# Patient Record
Sex: MALE | Race: WHITE | HISPANIC OR LATINO | Employment: FULL TIME | URBAN - METROPOLITAN AREA
[De-identification: names, ages, dates, MRNs, and addresses within clinical notes are randomized per-mention and may not be internally consistent; named-entity substitution may affect disease eponyms.]

---

## 2017-06-23 ENCOUNTER — HOSPITAL ENCOUNTER (EMERGENCY)
Facility: MEDICAL CENTER | Age: 37
End: 2017-06-23
Attending: EMERGENCY MEDICINE
Payer: COMMERCIAL

## 2017-06-23 VITALS
HEART RATE: 77 BPM | SYSTOLIC BLOOD PRESSURE: 140 MMHG | OXYGEN SATURATION: 94 % | HEIGHT: 68 IN | RESPIRATION RATE: 18 BRPM | TEMPERATURE: 97.9 F | WEIGHT: 209.66 LBS | DIASTOLIC BLOOD PRESSURE: 94 MMHG | BODY MASS INDEX: 31.78 KG/M2

## 2017-06-23 DIAGNOSIS — S61.512A LACERATION OF LEFT WRIST, INITIAL ENCOUNTER: ICD-10-CM

## 2017-06-23 PROCEDURE — 303747 HCHG EXTRA SUTURE

## 2017-06-23 PROCEDURE — 90715 TDAP VACCINE 7 YRS/> IM: CPT | Performed by: EMERGENCY MEDICINE

## 2017-06-23 PROCEDURE — 304999 HCHG REPAIR-SIMPLE/INTERMED LEVEL 1

## 2017-06-23 PROCEDURE — 99283 EMERGENCY DEPT VISIT LOW MDM: CPT

## 2017-06-23 PROCEDURE — 90471 IMMUNIZATION ADMIN: CPT

## 2017-06-23 PROCEDURE — 700111 HCHG RX REV CODE 636 W/ 250 OVERRIDE (IP): Performed by: EMERGENCY MEDICINE

## 2017-06-23 PROCEDURE — 304217 HCHG IRRIGATION SYSTEM

## 2017-06-23 RX ADMIN — CLOSTRIDIUM TETANI TOXOID ANTIGEN (FORMALDEHYDE INACTIVATED), CORYNEBACTERIUM DIPHTHERIAE TOXOID ANTIGEN (FORMALDEHYDE INACTIVATED), BORDETELLA PERTUSSIS TOXOID ANTIGEN (GLUTARALDEHYDE INACTIVATED), BORDETELLA PERTUSSIS FILAMENTOUS HEMAGGLUTININ ANTIGEN (FORMALDEHYDE INACTIVATED), BORDETELLA PERTUSSIS PERTACTIN ANTIGEN, AND BORDETELLA PERTUSSIS FIMBRIAE 2/3 ANTIGEN 0.5 ML: 5; 2; 2.5; 5; 3; 5 INJECTION, SUSPENSION INTRAMUSCULAR at 14:32

## 2017-06-23 ASSESSMENT — PAIN SCALES - GENERAL
PAINLEVEL_OUTOF10: 0
PAINLEVEL_OUTOF10: 6

## 2017-06-23 NOTE — ED PROVIDER NOTES
"ED Provider Note    CHIEF COMPLAINT  Chief Complaint   Patient presents with   • Wrist Laceration       [facility]  Rayray Hensley is a 37 y.o. male who presents for evaluation of a laceration to his left wrist. 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 He developed bleeding and noted a laceration and is here for evaluation his last tetanus was 8 years ago. He denies any numbness or tingling to his hand he has full ability to move all his fingers and wrist. He is otherwise good health    REVIEW OF SYSTEMS  See HPI for further details. He denies any lightheadedness or dizziness    PAST MEDICAL HISTORY  History reviewed. No pertinent past medical history.    FAMILY HISTORY  History reviewed. No pertinent family history.    SOCIAL HISTORY  Social History     Social History   • Marital Status: N/A     Spouse Name: N/A   • Number of Children: N/A   • Years of Education: N/A     Social History Main Topics   • Smoking status: Never Smoker    • Smokeless tobacco: None   • Alcohol Use: Yes      Comment: Sometimes   • Drug Use: No   • Sexual Activity: Not Asked     Other Topics Concern   • None     Social History Narrative   • None       SURGICAL HISTORY  History reviewed. No pertinent past surgical history.    CURRENT MEDICATIONS  Home Medications     Reviewed by Madhavi Kaufman R.N. (Registered Nurse) on 06/23/17 at 1411  Med List Status: <None>    Medication Last Dose Status          Patient Boni Taking any Medications                         ALLERGIES  No Known Allergies    PHYSICAL EXAM  VITAL SIGNS: /96 mmHg  Pulse 81  Temp(Src) 36.6 °C (97.9 °F)  Resp 16  Ht 1.727 m (5' 8\")  Wt 95.1 kg (209 lb 10.5 oz)  BMI 31.89 kg/m2  SpO2 94%  Constitutional :  Well developed, Well nourished, No acute distress, Non-toxic appearance.   HENT: Head is atraumatic normocephalic  Eyes: Normal-appearing no evidence of infection  Neck: Normal range of motion, No tenderness, Supple, " No stridor.   Left wrist is notable for approximately 3 and half centimeter laceration to the flexor aspect of the left wrist, the patient has full range of motion of all digits of the left hand including thumb. He has no neurological deficit noted  Thorax & Lungs: No respiratory distress  Skin: Warm, Dry, No erythema, No rash.               COURSE & MEDICAL DECISION MAKING  Pertinent Labs & Imaging studies reviewed. (See chart for details)  The patient is presenting for evaluation of a laceration to his left wrist. The 3.5 cm laceration was anesthetized with 1% lidocaine and explored for any evidence of tendon injury none is noted no foreign bodies noted . The laceration is irrigated per protocol and closed with simple 4-0 nylon sutures. Wound care instructions are given tetanus immunization is given suture removal is recommended in 7 days is discharged in stable condition    FINAL IMPRESSION  1. Laceration of left wrist  2.   3.      Electronically signed by: Frederick Dickinson, 6/23/2017

## 2017-06-23 NOTE — DISCHARGE INSTRUCTIONS
Laceration Care, Adult  A laceration is a cut that goes through all layers of the skin. The cut also goes into the tissue that is right under the skin. Some cuts heal on their own. Others need to be closed with stitches (sutures), staples, skin adhesive strips, or wound glue. Taking care of your cut lowers your risk of infection and helps your cut to heal better.  HOW TO TAKE CARE OF YOUR CUT  For stitches or staples:  · Keep the wound clean and dry.  · If you were given a bandage (dressing), you should change it at least one time per day or as told by your doctor. You should also change it if it gets wet or dirty.  · Keep the wound completely dry for the first 24 hours or as told by your doctor. After that time, you may take a shower or a bath. However, make sure that the wound is not soaked in water until after the stitches or staples have been removed.  · Clean the wound one time each day or as told by your doctor:  ¨ Wash the wound with soap and water.  ¨ Rinse the wound with water until all of the soap comes off.  ¨ Pat the wound dry with a clean towel. Do not rub the wound.  · After you clean the wound, put a thin layer of antibiotic ointment on it as told by your doctor. This ointment:  ¨ Helps to prevent infection.  ¨ Keeps the bandage from sticking to the wound.  · Have your stitches or staples removed as told by your doctor.  If your doctor used skin adhesive strips:   · Keep the wound clean and dry.  · If you were given a bandage, you should change it at least one time per day or as told by your doctor. You should also change it if it gets dirty or wet.  · Do not get the skin adhesive strips wet. You can take a shower or a bath, but be careful to keep the wound dry.  · If the wound gets wet, pat it dry with a clean towel. Do not rub the wound.  · Skin adhesive strips fall off on their own. You can trim the strips as the wound heals. Do not remove any strips that are still stuck to the wound. They will  fall off after a while.  If your doctor used wound glue:  · Try to keep your wound dry, but you may briefly wet it in the shower or bath. Do not soak the wound in water, such as by swimming.  · After you take a shower or a bath, gently pat the wound dry with a clean towel. Do not rub the wound.  · Do not do any activities that will make you really sweaty until the skin glue has fallen off on its own.  · Do not apply liquid, cream, or ointment medicine to your wound while the skin glue is still on.  · If you were given a bandage, you should change it at least one time per day or as told by your doctor. You should also change it if it gets dirty or wet.  · If a bandage is placed over the wound, do not let the tape for the bandage touch the skin glue.  · Do not pick at the glue. The skin glue usually stays on for 5-10 days. Then, it falls off of the skin.  General Instructions   · To help prevent scarring, make sure to cover your wound with sunscreen whenever you are outside after stitches are removed, after adhesive strips are removed, or when wound glue stays in place and the wound is healed. Make sure to wear a sunscreen of at least 30 SPF.  · Take over-the-counter and prescription medicines only as told by your doctor.  · If you were given antibiotic medicine or ointment, take or apply it as told by your doctor. Do not stop using the antibiotic even if your wound is getting better.  · Do not scratch or pick at the wound.  · Keep all follow-up visits as told by your doctor. This is important.  · Check your wound every day for signs of infection. Watch for:  ¨ Redness, swelling, or pain.  ¨ Fluid, blood, or pus.  · Raise (elevate) the injured area above the level of your heart while you are sitting or lying down, if possible.  GET HELP IF:  · You got a tetanus shot and you have any of these problems at the injection site:  ¨ Swelling.  ¨ Very bad pain.  ¨ Redness.  ¨ Bleeding.  · You have a fever.  · A wound that was  closed breaks open.  · You notice a bad smell coming from your wound or your bandage.  · You notice something coming out of the wound, such as wood or glass.  · Medicine does not help your pain.  · You have more redness, swelling, or pain at the site of your wound.  · You have fluid, blood, or pus coming from your wound.  · You notice a change in the color of your skin near your wound.  · You need to change the bandage often because fluid, blood, or pus is coming from the wound.  · You start to have a new rash.  · You start to have numbness around the wound.  GET HELP RIGHT AWAY IF:  · You have very bad swelling around the wound.  · Your pain suddenly gets worse and is very bad.  · You notice painful lumps near the wound or on skin that is anywhere on your body.  · You have a red streak going away from your wound.  · The wound is on your hand or foot and you cannot move a finger or toe like you usually can.  · The wound is on your hand or foot and you notice that your fingers or toes look pale or bluish.     This information is not intended to replace advice given to you by your health care provider. Make sure you discuss any questions you have with your health care provider.     Document Released: 06/05/2009 Document Revised: 05/03/2016 Document Reviewed: 12/14/2015  [employer] Interactive Patient Education ©2016 [employer]

## 2017-06-23 NOTE — ED NOTES
ERP at bedside. Pt agrees with plan of care discussed by ERP. AIDET acknowledged with patient. Medicinal interventions carried out per ERP orders. Lac numbed. Irrigation in process. Gurney in low position, side rail up for pt safety. Call light within reach. Will continue to monitor.

## 2017-06-23 NOTE — LETTER
"  FORM C-4:  EMPLOYEE’S CLAIM FOR COMPENSATION/ REPORT OF INITIAL TREATMENT  EMPLOYEE’S CLAIM - PROVIDE ALL INFORMATION REQUESTED   First Name  Rayray Last Name  Shellie Birthdate             Age  1980 37 y.o. Sex  male Claim Number   Home Employee Address  [address]  [place]                                     [place]  [address] Height  1.727 m (5' 8\") Weight  95.1 kg (209 lb 10.5 oz) SSN  Unable to Obtain   Mailing Employee Address                           [address]  [place]               Zip  [address] Telephone  [phone] (home)  Primary Language Spoken  ENGLISH   Insurer  [employer] Third Party   [employer] Employee's Occupation (Job Title) When Injury or Occupational Disease Occurred  [employer]   Employer's Name  [employer] Telephone  [phone]    Employer Address  [address] [facility] [place]  [address]   Date of Injury  6/23/2017       Hour of Injury  2:30 PM Date Employer Notified  6/23/2017 Last Day of Work after Injury or Occupational Disease  6/23/2017 Supervisor to Whom Injury Reported  Max   Address or Location of Accident (if applicable)  [[facility]]   What were you doing at the time of accident? (if applicable)  Cortando Granito    How did this injury or occupational disease occur? Be specific and answer in detail. Use additional sheet if necessary)  Cortando se memovio la maquina por agarrar el pedaso de granito paro que no se callera   If you believe that you have an occupational disease, when did you first have knowledge of the disability and it relationship to your employment?  N/A Witnesses to the Accident  Eduardo     Nature of Injury or Occupational Disease  Laceration  Part(s) of Body Injured or Affected  Wrist (L) and Hand (L), N/A, N/A    I certify that the above is true and correct to the best of my knowledge and that I have provided this information in order to obtain the benefits of " [place]  I hereby authorize any physician, chiropractor, surgeon, practitioner, or other person, any hospital, including [facility] or [facility], any medical service organization, any insurance company, or other institution or organization to release to each other, any medical or other information, including benefits paid or payable, pertinent to this injury or disease, except information relative to diagnosis, treatment and/or counseling for AIDS, psychological conditions, alcohol or controlled substances, for which I must give specific authorization.  A Photostat of this authorization shall be as valid as the original.   Date  06/23/2017 Place  [facility] [facility] Employee’s Signature   THIS REPORT MUST BE COMPLETED AND MAILED WITHIN 3 WORKING DAYS OF TREATMENT   Place  [facility], EMERGENCY DEPT  Name of Facility   [facility]   Date  6/23/2017 Diagnosis  (S61.512A) Laceration of left wrist, initial encounter Is there evidence the injured employee was under the influence of alcohol and/or another controlled substance at the time of accident?   Hour  3:32 PM Description of Injury or Disease  Laceration of left wrist, initial encounter No   Treatment  Sutured laceration  Have you advised the patient to remain off work five days or more?         No   X-Ray Findings      If Yes   From Date    To Date      From information given by the employee, together with medical evidence, can you directly connect this injury or occupational disease as job incurred?  Yes If No, is the employee capable of: Full Duty  No Modified Duty  Yes   Is additional medical care by a physician indicated?    If Modified Duty, Specify any Limitations / Restrictions  Limited use of left hand     Do you know of any previous injury or disease contributing to this condition  "or occupational disease?  No   Date  6/23/2017 Print Doctor’s Name  Frederick Dickinson certify the employer’s copy of this form was mailed on:06/23/2017   Address  [address] [place] [address] [phone] Insurer’s Use Only   [facility]  [id number]    Provider’s Tax ID Number  [id number] Telephone  Dept: [phone]    Doctor’s Signature  e-FREDERICK Canela M.D. Degree   MD    Original - TREATING PHYSICIAN OR CHIROPRACTOR   Pg 2-Insurer/TPA   Pg 3-Employer   Pg 4-Employee                                                                                                  Form C-4 (rev01/03)     BRIEF DESCRIPTION OF RIGHTS AND BENEFITS  (Pursuant to [place] 616C.050)    Notice of Injury or Occupational Disease (Incident Report Form C-1): If an injury or occupational disease (OD) arises out of and in the course of employment, you must provide written notice to your employer as soon as practicable, but no later than 7 days after the accident or OD. Your employer shall maintain a sufficient supply of the required forms.    Claim for Compensation (Form C-4): If medical treatment is sought, the form C-4 is available at the place of initial treatment. A completed \"Claim for Compensation\" (Form C-4) must be filed within 90 days after an accident or OD. The treating physician or chiropractor must, within 3 working days after treatment, complete and mail to the employer, the employer's insurer and third-party , the Claim for Compensation.    Medical Treatment: If you require medical treatment for your on-the-job injury or OD, you may be required to select a physician or chiropractor from a list provided by your workers’ compensation insurer, if it has contracted with an Organization for Managed Care (MCO) or Preferred Provider Organization (PPO) or providers of health care. If your employer has not entered into a contract with an MCO or PPO, you may select a physician or chiropractor " from the Panel of Physicians and Chiropractors. Any medical costs related to your industrial injury or OD will be paid by your insurer.    Temporary Total Disability (TTD): If your doctor has certified that you are unable to work for a period of at least 5 consecutive days, or 5 cumulative days in a 20-day period, or places restrictions on you that your employer does not accommodate, you may be entitled to TTD compensation.    Temporary Partial Disability (TPD): If the wage you receive upon reemployment is less than the compensation for TTD to which you are entitled, the insurer may be required to pay you TPD compensation to make up the difference. TPD can only be paid for a maximum of 24 months.    Permanent Partial Disability (PPD): When your medical condition is stable and there is an indication of a PPD as a result of your injury or OD, within 30 days, your insurer must arrange for an evaluation by a rating physician or chiropractor to determine the degree of your PPD. The amount of your PPD award depends on the date of injury, the results of the PPD evaluation and your age and wage.    Permanent Total Disability (PTD): If you are medically certified by a treating physician or chiropractor as permanently and totally disabled and have been granted a PTD status by your insurer, you are entitled to receive monthly benefits not to exceed 66 2/3% of your average monthly wage. The amount of your PTD payments is subject to reduction if you previously received a PPD award.    Vocational Rehabilitation Services: You may be eligible for vocational rehabilitation services if you are unable to return to the job due to a permanent physical impairment or permanent restrictions as a result of your injury or occupational disease.    Transportation and Per Consuelo Reimbursement: You may be eligible for travel expenses and per consuelo associated with medical treatment.  Reopening: You may be able to reopen your claim if your condition  worsens after claim closure.    Appeal Process: If you disagree with a written determination issued by the insurer or the insurer does not respond to your request, you may appeal to the Department of Administration, , by following the instructions contained in your determination letter. You must appeal the determination within 70 days from the date of the determination letter at [address], [address], [place], [place] [address], or [address], [place], [place] [address]. If you disagree with the  decision, you may appeal to the Department of Administration, . You must file your appeal within 30 days from the date of the  decision letter at [address], [address], [place], [place] [address], or [address], [facility] [address], [place], [place] [address]. If you disagree with a decision of an , you may file a petition for judicial review with the District Court. You must do so within 30 days of the Appeal Officer’s decision. You may be represented by an  at your own expense or you may contact the [facility] for possible representation.    [place]  for Injured Workers (NAIW): If you disagree with a  decision, you may request that NAIW represent you without charge at an  Hearing. For information regarding denial of benefits, you may contact the [facility] at: [address], [address], [place] [address], [phone], or [address], [place] [address], [phone]    To File a Complaint with the Division: If you wish to file a complaint with the  of the Division of Industrial Relations (DIR), please contact the Workers’ Compensation Section, [address], [address], [place], [place] [address], telephone ([phone]) [phone], or [address], [place] [address], telephone [phone]  [phone].    For assistance with Workers’ Compensation Issues: you may contact the Office of the Governor Consumer Health Assistance, [address] [place] [address], [address], [place] [address], Toll Free 1-736.595.7404, Web site: [email or web address].[email or web address]., E-mail [email or web address].                                                                                                                                                                               __________________________________________________________________                                    06/23/2017            Employee Name / Signature                                                                                                                            Date                                       D-2 (rev. 10/07)

## 2017-06-23 NOTE — ED AVS SNAPSHOT
ANTERIOS Access Code: [email or web address]  Expires: 7/23/2017  3:42 PM    Your email address is not on file at [facility].  Email Addresses are required for you to sign up for ANTERIOS, please contact [phone] to verify your personal information and to provide your email address prior to attempting to register for ANTERIOS.    Rayray Treadwelloz  [address] [place] [address]    ANTERIOS  A secure, online tool to manage your health information     [facility]’s ANTERIOS® is a secure, online tool that connects you to your personalized health information from the privacy of your home -- day or night - making it very easy for you to manage your healthcare. Once the activation process is completed, you can even access your medical information using the ANTERIOS uche, which is available for free in the Apple Uche store or Google Play store.     To learn more about ANTERIOS, visit [email or web address]    There are two levels of access available (as shown below):   My Chart Features  [facility] Primary Care Doctor [facility]  Specialists [facility]  Urgent  Care Non-[facility] Primary Care Doctor   Email your healthcare team securely and privately 24/7 X X X    Manage appointments: schedule your next appointment; view details of past/upcoming appointments X      Request prescription refills. X      View recent personal medical records, including lab and immunizations X X X X   View health record, including health history, allergies, medications X X X X   Read reports about your outpatient visits, procedures, consult and ER notes X X X X   See your discharge summary, which is a recap of your hospital and/or ER visit that includes your diagnosis, lab results, and care plan X X  X     How to register for [employer]:  Once your e-mail address has been verified, follow the following steps to sign up for [employer].     1. Go to  [email or web address]  2. Click on the Sign Up Now box, which takes you to the New Member Sign Up page. You will need to  provide the following information:  a. Enter your Giphy Access Code exactly as it appears at the top of this page. (You will not need to use this code after you’ve completed the sign-up process. If you do not sign up before the expiration date, you must request a new code.)   b. Enter your date of birth.   c. Enter your home email address.   d. Click Submit, and follow the next screen’s instructions.  3. Create a Giphy ID. This will be your Giphy login ID and cannot be changed, so think of one that is secure and easy to remember.  4. Create a Giphy password. You can change your password at any time.  5. Enter your Password Reset Question and Answer. This can be used at a later time if you forget your password.   6. Enter your e-mail address. This allows you to receive e-mail notifications when new information is available in Giphy.  7. Click Sign Up. You can now view your health information.    For assistance activating your Giphy account, call [phone]

## 2017-06-23 NOTE — ED AVS SNAPSHOT
Home Care Instructions                                                                                                                Rayray Hensley   MRN: [id number]    Department:  [facility], Emergency Dept   Date of Visit:  6/23/2017            [facility], Emergency Dept    [address]    [place] [address]    Phone:  [phone]      You were seen by     Frederick Dickinson M.D.      Your Diagnosis Was     Laceration of left wrist, initial encounter     S61.512A       These are the medications you received during your hospitalization from 06/23/2017 1359 to 06/23/2017 1543     Date/Time Order Dose Route Action    06/23/2017 1432 tetanus-dipth-acell pertussis (ADACEL) inj 0.5 mL 0.5 mL Intramuscular Given      Follow-up Information     1. Follow up with [facility]. Schedule an appointment as soon as possible for a visit in 3 days.    Why:  suture removal recommended in 7 days    Contact information    [address] [place] [id number] [phone]        Medication Information     Review all of your home medications and newly ordered medications with your primary doctor and/or pharmacist as soon as possible. Follow medication instructions as directed by your doctor and/or pharmacist.     Please keep your complete medication list with you and share with your physician. Update the information when medications are discontinued, doses are changed, or new medications (including over-the-counter products) are added; and carry medication information at all times in the event of emergency situations.               Medication List      Notice     You have not been prescribed any medications.              Discharge Instructions       Laceration Care, Adult  A laceration is a cut that goes through all layers of the skin. The cut also goes into the tissue that is right under the skin. Some cuts heal on their own. Others need to be closed with stitches (sutures),  staples, skin adhesive strips, or wound glue. Taking care of your cut lowers your risk of infection and helps your cut to heal better.  HOW TO TAKE CARE OF YOUR CUT  For stitches or staples:  · Keep the wound clean and dry.  · If you were given a bandage (dressing), you should change it at least one time per day or as told by your doctor. You should also change it if it gets wet or dirty.  · Keep the wound completely dry for the first 24 hours or as told by your doctor. After that time, you may take a shower or a bath. However, make sure that the wound is not soaked in water until after the stitches or staples have been removed.  · Clean the wound one time each day or as told by your doctor:  ¨ Wash the wound with soap and water.  ¨ Rinse the wound with water until all of the soap comes off.  ¨ Pat the wound dry with a clean towel. Do not rub the wound.  · After you clean the wound, put a thin layer of antibiotic ointment on it as told by your doctor. This ointment:  ¨ Helps to prevent infection.  ¨ Keeps the bandage from sticking to the wound.  · Have your stitches or staples removed as told by your doctor.  If your doctor used skin adhesive strips:   · Keep the wound clean and dry.  · If you were given a bandage, you should change it at least one time per day or as told by your doctor. You should also change it if it gets dirty or wet.  · Do not get the skin adhesive strips wet. You can take a shower or a bath, but be careful to keep the wound dry.  · If the wound gets wet, pat it dry with a clean towel. Do not rub the wound.  · Skin adhesive strips fall off on their own. You can trim the strips as the wound heals. Do not remove any strips that are still stuck to the wound. They will fall off after a while.  If your doctor used wound glue:  · Try to keep your wound dry, but you may briefly wet it in the shower or bath. Do not soak the wound in water, such as by swimming.  · After you take a shower or a bath,  gently pat the wound dry with a clean towel. Do not rub the wound.  · Do not do any activities that will make you really sweaty until the skin glue has fallen off on its own.  · Do not apply liquid, cream, or ointment medicine to your wound while the skin glue is still on.  · If you were given a bandage, you should change it at least one time per day or as told by your doctor. You should also change it if it gets dirty or wet.  · If a bandage is placed over the wound, do not let the tape for the bandage touch the skin glue.  · Do not pick at the glue. The skin glue usually stays on for 5-10 days. Then, it falls off of the skin.  General Instructions   · To help prevent scarring, make sure to cover your wound with sunscreen whenever you are outside after stitches are removed, after adhesive strips are removed, or when wound glue stays in place and the wound is healed. Make sure to wear a sunscreen of at least 30 SPF.  · Take over-the-counter and prescription medicines only as told by your doctor.  · If you were given antibiotic medicine or ointment, take or apply it as told by your doctor. Do not stop using the antibiotic even if your wound is getting better.  · Do not scratch or pick at the wound.  · Keep all follow-up visits as told by your doctor. This is important.  · Check your wound every day for signs of infection. Watch for:  ¨ Redness, swelling, or pain.  ¨ Fluid, blood, or pus.  · Raise (elevate) the injured area above the level of your heart while you are sitting or lying down, if possible.  GET HELP IF:  · You got a tetanus shot and you have any of these problems at the injection site:  ¨ Swelling.  ¨ Very bad pain.  ¨ Redness.  ¨ Bleeding.  · You have a fever.  · A wound that was closed breaks open.  · You notice a bad smell coming from your wound or your bandage.  · You notice something coming out of the wound, such as wood or glass.  · Medicine does not help your pain.  · You have more redness,  swelling, or pain at the site of your wound.  · You have fluid, blood, or pus coming from your wound.  · You notice a change in the color of your skin near your wound.  · You need to change the bandage often because fluid, blood, or pus is coming from the wound.  · You start to have a new rash.  · You start to have numbness around the wound.  GET HELP RIGHT AWAY IF:  · You have very bad swelling around the wound.  · Your pain suddenly gets worse and is very bad.  · You notice painful lumps near the wound or on skin that is anywhere on your body.  · You have a red streak going away from your wound.  · The wound is on your hand or foot and you cannot move a finger or toe like you usually can.  · The wound is on your hand or foot and you notice that your fingers or toes look pale or bluish.     This information is not intended to replace advice given to you by your health care provider. Make sure you discuss any questions you have with your health care provider.     Document Released: 06/05/2009 Document Revised: 05/03/2016 Document Reviewed: 12/14/2015  [employer] Interactive Patient Education ©2016 [employer]            Patient Information     Patient Information    Following emergency treatment: all patient requiring follow-up care must return either to a private physician or a clinic if your condition worsens before you are able to obtain further medical attention, please return to the emergency room.     Billing Information    At [facility], we work to make the billing process streamlined for our patients.  Our Representatives are here to answer any questions you may have regarding your hospital bill.  If you have insurance coverage and have supplied your insurance information to us, we will submit a claim to your insurer on your behalf.  Should you have any questions regarding your bill, we can be reached online or by phone as follows:  Online: You are able pay your bills online or live chat with our  representatives about any billing questions you may have. We are here to help Monday - Friday from 8:00am to 7:30pm and 9:00am - 12:00pm on Saturdays.  Please visit [email or web address]  for more information.   Phone:  [phone] or [phone]    Please note that your emergency physician, surgeon, pathologist, radiologist, anesthesiologist, and other specialists are not employed by [facility] and will therefore bill separately for their services.  Please contact them directly for any questions concerning their bills at the numbers below:     Emergency Physician Services:  [phone]  [facility]:  [phone]  [facility]:  [phone]  [facility]:  [phone]    1. Your final bill may vary from the amount quoted upon discharge if all procedures are not complete at that time, or if your doctor has additional procedures of which we are not aware. You will receive an additional bill if you return to the Emergency Department at [facility] for suture removal regardless of the facility of which the sutures were placed.     2. Please arrange for settlement of this account at the emergency registration.    3. All self-pay accounts are due in full at the time of treatment.  If you are unable to meet this obligation then payment is expected within 4-5 days.     4. If you have had radiology studies (CT, X-ray, Ultrasound, MRI), you have received a preliminary result during your emergency department visit. Please contact the radiology department [phone] to receive a copy of your final result. Please discuss the Final result with your primary physician or with the follow up physician provided.     Crisis Hotline:  [place] Crisis Hotline:  1-173-PUHJSPO or 1-589.859.7466  [place] Crisis Hotline:    1-147.531.3739 or 558-884-2554         ED Discharge Follow Up Questions    1. In order to provide you with very good care, we would  like to follow up with a phone call in the next few days.  May we have your permission to contact you?     YES /  NO    2. What is the best phone number to call you? (       )_____-__________    3. What is the best time to call you?      Morning  /  Afternoon  /  Evening                   Patient Signature:  ____________________________________________________________    Date:  ____________________________________________________________

## 2017-06-23 NOTE — ED AVS SNAPSHOT
6/23/2017    Rayray Hensley  [address] [place] [address]    Dear Rayray:    [facility] wants to ensure your discharge home is safe and you or your loved ones have had all of your questions answered regarding your care after you leave the hospital.    Below is a list of resources and contact information should you have any questions regarding your hospital stay, follow-up instructions, or active medical symptoms.    Questions or Concerns Regarding… Contact   Medical Questions Related to Your Discharge  (7 days a week, 8am-5pm) Contact a Nurse Care Coordinator   [phone]   Medical Questions Not Related to Your Discharge  (24 hours a day / 7 days a week)  Contact the Nurse Health Line   [phone]    Medications or Discharge Instructions Refer to your discharge packet   or contact your [facility] Primary Care Provider   [phone]   Follow-up Appointment(s) Schedule your appointment via [employer]   or contact Scheduling [phone]   Billing Review your statement via [employer]  or contact Billing [phone]   Medical Records Review your records via [employer]   or contact Medical Records [phone]     You may receive a telephone call within two days of discharge. This call is to make certain you understand your discharge instructions and have the opportunity to have any questions answered. You can also easily access your medical information, test results and upcoming appointments via the [employer] free online health management tool. You can learn more and sign up at [employer]. For assistance setting up your [employer] account, please call [phone].    Once again, we want to ensure your discharge home is safe and that you have a clear understanding of any next steps in your care. If you have any questions or concerns, please do not hesitate to contact us, we are here for you. Thank you for choosing [facility] for your healthcare needs.    Sincerely,    Your [facility] Healthcare Team

## 2017-06-30 ENCOUNTER — OCCUPATIONAL MEDICINE (OUTPATIENT)
Dept: OCCUPATIONAL MEDICINE | Facility: CLINIC | Age: 37
End: 2017-06-30
Payer: COMMERCIAL

## 2017-06-30 VITALS
OXYGEN SATURATION: 73 % | HEIGHT: 68 IN | RESPIRATION RATE: 16 BRPM | SYSTOLIC BLOOD PRESSURE: 120 MMHG | BODY MASS INDEX: 31.52 KG/M2 | TEMPERATURE: 98.7 F | DIASTOLIC BLOOD PRESSURE: 90 MMHG | WEIGHT: 208 LBS | HEART RATE: 96 BPM

## 2017-06-30 DIAGNOSIS — S61.512A LACERATION OF LEFT WRIST WITHOUT COMPLICATION, INITIAL ENCOUNTER: ICD-10-CM

## 2017-06-30 PROCEDURE — 99202 OFFICE O/P NEW SF 15 MIN: CPT | Performed by: PREVENTIVE MEDICINE

## 2017-06-30 NOTE — PROGRESS NOTES
"Subjective:      Rayray Hensley is a 37 y.o. male who presents with Other      DOI 6/23/2017: Laceration to his left wrist. 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 Wound repaired in ER without complication, no tendon involvement. Patient notes some pain just distal to the wound. He has notes occasional numbness going up into his ring finger. States he has full range of motion of digits and wrist. He has not been working since the incident.     Other        ROS  ROS: All systems were reviewed on intake form, form was reviewed and signed. See scanned documents in media. Pertinent positives and negatives included in HPI.    PMH: No pertinent past medical history to this problem  MEDS: Medications were reviewed in Epic  ALLERGIES: No Known Allergies  SOCHX: Works as   FH: No pertinent family history to this problem       Objective:     /90 mmHg  Pulse 96  Temp(Src) 37.1 °C (98.7 °F)  Resp 16  Ht 1.727 m (5' 7.99\")  Wt 94.348 kg (208 lb)  BMI 31.63 kg/m2  SpO2 73%     Physical Exam   Constitutional: He appears well-developed and well-nourished.   HENT:   Right Ear: External ear normal.   Left Ear: External ear normal.   Eyes: Conjunctivae and EOM are normal.   Cardiovascular: Normal rate.    Pulmonary/Chest: Effort normal.   Neurological: He is alert.   Skin: Skin is warm and dry.   Psychiatric: He has a normal mood and affect. Judgment normal.       Left wrist: 4 cm laceration volar aspect of wrist. Sutures  intact, wound edges well approximated. Tenderness palpation just distal to the wound. Full range of motion. Sensation intact.    Sutures removed, small area of wound dehiscence about 1.5 cm.       Assessment/Plan:     1. Laceration of left wrist without complication, initial encounter  Keep wound covered at work until wound closed   Restricted duty   Follow-up Thursday for release to full duty          "

## 2017-06-30 NOTE — Clinical Note
[address],   [address] [place] [address]  Phone: [phone] - Fax: [phone]        [facility] Progress Report and Disability Certification  Date of Service: 6/30/2017   No Show:  No  Date / Time of Next Visit: 7/6/20178:40AM   Claim Information   Patient Name: Rayray Hensley  Claim Number:     Employer:  [employer] Date of Injury: 6/23/2017     Insurer / TPA: [employer]  ID / SSN:     Occupation: Fabricador  Diagnosis: The encounter diagnosis was Laceration of left wrist without complication, initial encounter.    Medical Information   Related to Industrial Injury? Yes    Subjective Complaints:  DOI 6/23/2017: Laceration to his left wrist. 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 Wound repaired in ER without complication, no tendon involvement. Patient notes some pain just distal to the wound. He has notes occasional numbness going up into his ring finger. States he has full range of motion of digits and wrist. He has not been working since the incident.   Objective Findings: Left wrist: 4 cm laceration volar aspect of wrist. Sutures  intact, wound edges well approximated. Tenderness palpation just distal to the wound. Full range of motion. Sensation intact.    Sutures removed, small area of wound dehiscence about 1.5 cm.   Pre-Existing Condition(s):     Assessment:   Condition Improved    Status: Additional Care Required  Permanent Disability:No    Plan:      Diagnostics:      Comments:  Keep wound covered at work until wound closed  Restricted duty  Follow-up Thursday for release to full duty      Disability Information   Status: Released to Restricted Duty    From:  6/30/2017  Through: 7/6/2017 Restrictions are: Temporary   Physical Restrictions   Sitting:    Standing:    Stooping:    Bending:      Squatting:    Walking:    Climbing:    Pushing:      Pulling:   Other:    Reaching Above Shoulder (L):   Reaching Above Shoulder (R):       Reaching Below Shoulder (L):    Reaching Below Shoulder (R):      Not to exceed Weight Limits   Carrying(hrs):   Weight Limit(lb):   Lifting(hrs):   Weight  Limit(lb):     Comments: Limit left hand to less than 20 lbs lifting. Keep wound covered at work    Repetitive Actions   Hands: i.e. Fine Manipulations from Grasping:     Feet: i.e. Operating Foot Controls:     Driving / Operate Machinery:     Physician Name: César Dunn D.O. Physician Signature: CÉSAR Mckay D.O. e-Signature: Dr. Trevin Pappas, Medical Director   Clinic Name / Location: [address],   [address] [place] [address] Clinic Phone Number: Dept: [phone]   Appointment Time: 9:00 Am Visit Start Time: 8:44 AM   Check-In Time:  8:37 Am Visit Discharge Time:  9:04AM    Original-Treating Physician or Chiropractor    Page 2-Insurer/TPA    Page 3-Employer    Page 4-Employee

## 2017-06-30 NOTE — MR AVS SNAPSHOT
"        Rayray Hensley   2017 9:00 AM   Occupational Medicine   MRN: [id number]    Department:  [facility]   Dept Phone:  [phone]    Description:  Male : 1980   Provider:  César Dunn D.O.           Reason for Visit     Other rm 3 laceration (L) wrist DOI 17 better      Allergies as of 2017     No Known Allergies      You were diagnosed with     Laceration of left wrist without complication, initial encounter   [[id number]]         Vital Signs     Blood Pressure Pulse Temperature Respirations Height Weight    120/90 mmHg 96 37.1 °C (98.7 °F) 16 1.727 m (5' 7.99\") 94.348 kg (208 lb)    Body Mass Index Oxygen Saturation Smoking Status             31.63 kg/m2 73% Never Smoker          Basic Information     Date Of Birth Sex Race Ethnicity Preferred Language    1980 Male White  Origin (Bahamian,Surinamese,Turkmen,Levon, etc) English      Health Maintenance     Patient has no pending health maintenance at this time      Current Immunizations     Tdap Vaccine 2017  2:32 PM      Below and/or attached are the medications your provider expects you to take. Review all of your home medications and newly ordered medications with your provider and/or pharmacist. Follow medication instructions as directed by your provider and/or pharmacist. Please keep your medication list with you and share with your provider. Update the information when medications are discontinued, doses are changed, or new medications (including over-the-counter products) are added; and carry medication information at all times in the event of emergency situations     Allergies:  No Known Allergies          Medications  Valid as of: 2017 -  9:02 AM    Generic Name Brand Name Tablet Size Instructions for use    .                 Medicines prescribed today were sent to:     [id number] - [place], [place] - [address]    [address] [place] [address]    Phone: [phone] Fax: [phone]   " Open 24 Hours?: No      Medication refill instructions:       If your prescription bottle indicates you have medication refills left, it is not necessary to call your provider’s office. Please contact your pharmacy and they will refill your medication.    If your prescription bottle indicates you do not have any refills left, you may request refills at any time through one of the following ways: The online [employer] system (except Urgent Care), by calling your provider’s office, or by asking your pharmacy to contact your provider’s office with a refill request. Medication refills are processed only during regular business hours and may not be available until the next business day. Your provider may request additional information or to have a follow-up visit with you prior to refilling your medication.   *Please Note: Medication refills are assigned a new Rx number when refilled electronically. Your pharmacy may indicate that no refills were authorized even though a new prescription for the same medication is available at the pharmacy. Please request the medicine by name with the pharmacy before contacting your provider for a refill.           [employer] Access Code: [email or web address]  Expires: 7/23/2017  3:42 PM    [employer]  A secure, online tool to manage your health information     [employer]’s [employer]® is a secure, online tool that connects you to your personalized health information from the privacy of your home -- day or night - making it very easy for you to manage your healthcare. Once the activation process is completed, you can even access your medical information using the [employer] uche, which is available for free in the Apple Uche store or Google Play store.     [employer] provides the following levels of access (as shown below):   My Chart Features   [facility] Primary Care Doctor [facility]  Specialists [facility]  Urgent  Care Non-[facility]  Primary Care  Doctor   Email your healthcare team securely and privately 24/7 X X X     Manage appointments: schedule your next appointment; view details of past/upcoming appointments X      Request prescription refills. X      View recent personal medical records, including lab and immunizations X X X X   View health record, including health history, allergies, medications X X X X   Read reports about your outpatient visits, procedures, consult and ER notes X X X X   See your discharge summary, which is a recap of your hospital and/or ER visit that includes your diagnosis, lab results, and care plan. X X       How to register for [employer]:  1. Go to  [email or web address].  2. Click on the Sign Up Now box, which takes you to the New Member Sign Up page. You will need to provide the following information:  a. Enter your [employer] Access Code exactly as it appears at the top of this page. (You will not need to use this code after you’ve completed the sign-up process. If you do not sign up before the expiration date, you must request a new code.)   b. Enter your date of birth.   c. Enter your home email address.   d. Click Submit, and follow the next screen’s instructions.  3. Create a [employer] ID. This will be your [employer] login ID and cannot be changed, so think of one that is secure and easy to remember.  4. Create a [employer] password. You can change your password at any time.  5. Enter your Password Reset Question and Answer. This can be used at a later time if you forget your password.   6. Enter your e-mail address. This allows you to receive e-mail notifications when new information is available in [employer].  7. Click Sign Up. You can now view your health information.    For assistance activating your [employer] account, call [phone]

## 2017-07-10 ENCOUNTER — OCCUPATIONAL MEDICINE (OUTPATIENT)
Dept: OCCUPATIONAL MEDICINE | Facility: CLINIC | Age: 37
End: 2017-07-10
Payer: COMMERCIAL

## 2017-07-10 VITALS
HEIGHT: 67 IN | TEMPERATURE: 98.6 F | RESPIRATION RATE: 12 BRPM | OXYGEN SATURATION: 97 % | SYSTOLIC BLOOD PRESSURE: 128 MMHG | BODY MASS INDEX: 32.65 KG/M2 | WEIGHT: 208 LBS | HEART RATE: 70 BPM | DIASTOLIC BLOOD PRESSURE: 82 MMHG

## 2017-07-10 DIAGNOSIS — S61.512A LACERATION OF LEFT WRIST WITHOUT COMPLICATION, INITIAL ENCOUNTER: ICD-10-CM

## 2017-07-10 PROCEDURE — 99212 OFFICE O/P EST SF 10 MIN: CPT | Performed by: PREVENTIVE MEDICINE

## 2017-07-10 ASSESSMENT — PAIN SCALES - GENERAL: PAINLEVEL: 4=SLIGHT-MODERATE PAIN

## 2017-07-10 NOTE — PROGRESS NOTES
"Subjective:      Rayray Hensley is a 37 y.o. male who presents with Follow-Up      DOI 6/23/2017: Laceration to his left wrist. 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 Patient states that his wound is healed but continues to have pain in his thumb especially. He also notes numbness and tingling in the first through third digits. States it seems little worse than before. He has been working full duty without difficulty.     HPI    ROS       Objective:     /82 mmHg  Pulse 70  Temp(Src) 37 °C (98.6 °F)  Resp 12  Ht 1.702 m (5' 7.01\")  Wt 94.348 kg (208 lb)  BMI 32.57 kg/m2  SpO2 97%     Physical Exam    Left wrist: 4 cm laceration volar aspect of wrist. wound edges well healed. Mild tenderness in this area. Full range of motion of wrist. Decreased sensation to light touch in the median nerve distribution.       Assessment/Plan:     1. Laceration of left wrist without complication, initial encounter  No special care required for wound at this point  Full Duty  Follow up 2 weeks    Referred to hand surgery given sensory findings        "

## 2017-07-10 NOTE — Clinical Note
[address],   [address] [address]  Phone: [phone] - Fax: [phone]        [facility] Progress Report and Disability Certification  Date of Service: 7/10/2017   No Show:  No  Date / Time of Next Visit: 7/25/2017@9:20AM    Claim Information   Patient Name: Rayray Hensley  Claim Number:     Employer:   [employer]   Date of Injury: 6/23/2017     Insurer / TPA: [employer]  ID / SSN:     Occupation: Fabricador  Diagnosis: The encounter diagnosis was Laceration of left wrist without complication, initial encounter.    Medical Information   Related to Industrial Injury? Yes    Subjective Complaints:  DOI 6/23/2017: Laceration to his left wrist. 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 Patient states that his wound is healed but continues to have pain in his thumb especially. He also notes numbness and tingling in the first through third digits. States it seems little worse than before. He has been working full duty without difficulty.   Objective Findings: Left wrist: 4 cm laceration volar aspect of wrist. wound edges well healed. Mild tenderness in this area. Full range of motion of wrist. Decreased sensation to light touch in the median nerve distribution.   Pre-Existing Condition(s):     Assessment:   Condition Improved    Status: Additional Care Required  Permanent Disability:No    Plan:      Diagnostics:      Comments:  No special care required for wound at this point  Full Duty  Follow up 2 weeks, if no improvement in symptoms will refer to hand surgery    Disability Information   Status: Released to Full Duty    From:  7/10/2017  Through: 7/25/2017 Restrictions are:     Physical Restrictions   Sitting:    Standing:    Stooping:    Bending:      Squatting:    Walking:    Climbing:    Pushing:      Pulling:    Other:    Reaching Above Shoulder (L):   Reaching  Above Shoulder (R):       Reaching Below Shoulder (L):    Reaching Below Shoulder (R):      Not to exceed Weight Limits   Carrying(hrs):   Weight Limit(lb):   Lifting(hrs):   Weight  Limit(lb):     Comments:      Repetitive Actions   Hands: i.e. Fine Manipulations from Grasping:     Feet: i.e. Operating Foot Controls:     Driving / Operate Machinery:     Physician Name: César Dunn D.O. Physician Signature: elizaSignTACÉSAR CORRIGAN D.O. e-Signature: Dr. Trevin Pappas, Medical Director   Clinic Name / Location: [address],   [address]  [place] [address] Clinic Phone Number: Dept: [phone]   Appointment Time: 11:40 Am Visit Start Time: 11:46 AM   Check-In Time:  11:40 Am Visit Discharge Time:  @12:06PM   Original-Treating Physician or Chiropractor    Page 2-Insurer/TPA    Page 3-Employer    Page 4-Employee

## 2017-07-10 NOTE — MR AVS SNAPSHOT
"        Rayray Hensley   7/10/2017 11:40 AM   Occupational Medicine   MRN: [id number]    Department:  [facility][1]   Dept Phone:  [phone]    Description:  Male : 1980   Provider:  César Dunn D.O.           Reason for Visit     Follow-Up WC DOI 17- LAC on Lt wrist- still in pain      Allergies as of 7/10/2017     No Known Allergies      You were diagnosed with     Laceration of left wrist without complication, initial encounter   [[id number]]         Vital Signs     Blood Pressure Pulse Temperature Respirations Height Weight    128/82 mmHg 70 37 °C (98.6 °F) 12 1.702 m (5' 7.01\") 94.348 kg (208 lb)    Body Mass Index Oxygen Saturation Smoking Status             32.57 kg/m2 97% Never Smoker          Basic Information     Date Of Birth Sex Race Ethnicity Preferred Language    1980 Male White  Origin (Icelandic,Guamanian,Nigerian,Greek, etc) English      Your appointments     2017  9:20 AM   Workers Compensation with César Dunn D.O.   [address] [address]   [phone]              Health Maintenance        Date Due Completion Dates    IMM INFLUENZA (1) 2017 ---    IMM DTaP/Tdap/Td Vaccine (2 - Td) 2027            Current Immunizations     Tdap Vaccine 2017  2:32 PM      Below and/or attached are the medications your provider expects you to take. Review all of your home medications and newly ordered medications with your provider and/or pharmacist. Follow medication instructions as directed by your provider and/or pharmacist. Please keep your medication list with you and share with your provider. Update the information when medications are discontinued, doses are changed, or new medications (including over-the-counter products) are added; and carry medication information at all times in the event of emergency situations     Allergies:  No Known Allergies          Medications  Valid " as of: July 10, 2017 - 12:24 PM    Generic Name Brand Name Tablet Size Instructions for use    .                 Medicines prescribed today were sent to:     [facility]/PHARMACY #[id number] - [place], [place] - [address] [facility]    [address]  [place] [address]    Phone: [phone] Fax: [phone]    Open 24 Hours?: No      Medication refill instructions:       If your prescription bottle indicates you have medication refills left, it is not necessary to call your provider’s office. Please contact your pharmacy and they will refill your medication.    If your prescription bottle indicates you do not have any refills left, you may request refills at any time through one of the following ways: The online [employer] system (except Urgent Care), by calling your provider’s office, or by asking your pharmacy to contact your provider’s office with a refill request. Medication refills are processed only during regular business hours and may not be available until the next business day. Your provider may request additional information or to have a follow-up visit with you prior to refilling your medication.   *Please Note: Medication refills are assigned a new Rx number when refilled electronically. Your pharmacy may indicate that no refills were authorized even though a new prescription for the same medication is available at the pharmacy. Please request the medicine by name with the pharmacy before contacting your provider for a refill.           [employer] Access Code: [email or web address]  Expires: 7/23/2017  3:42 PM    [employer]  A secure, online tool to manage your health information     [employer]’s [employer]® is a secure, online tool that connects you to your personalized health information from the privacy of your home -- day or night - making it very easy for you to manage your healthcare. Once the activation process is completed, you can even access your medical information using the [employer] uche, which is available for free in the Apple Uche  store or Google Play store.     [employer] provides the following levels of access (as shown below):   My Chart Features   [facility] Primary [facility] Doctor [facility]  Specialists [facility]  Urgent  Care Non-[facility]  Primary Care  Doctor   Email your healthcare team securely and privately 24/7 X X X    Manage appointments: schedule your next appointment; view details of past/upcoming appointments X      Request prescription refills. X      View recent personal medical records, including lab and immunizations X X X X   View health record, including health history, allergies, medications X X X X   Read reports about your outpatient visits, procedures, consult and ER notes X X X X   See your discharge summary, which is a recap of your hospital and/or ER visit that includes your diagnosis, lab results, and care plan. X X       How to register for [employer]:  1. Go to  [email or web address].  2. Click on the Sign Up Now box, which takes you to the New Member Sign Up page. You will need to provide the following information:  a. Enter your [employer] Access Code exactly as it appears at the top of this page. (You will not need to use this code after you’ve completed the sign-up process. If you do not sign up before the expiration date, you must request a new code.)   b. Enter your date of birth.   c. Enter your home email address.   d. Click Submit, and follow the next screen’s instructions.  3. Create a [employer] ID. This will be your [employer] login ID and cannot be changed, so think of one that is secure and easy to remember.  4. Create a [employer] password. You can change your password at any time.  5. Enter your Password Reset Question and Answer. This can be used at a later time if you forget your password.   6. Enter your e-mail address. This allows you to receive e-mail notifications when new information is available in [employer].  7. Click Sign Up. You can now view your health information.    For assistance activating your [employer]  account, call [phone]

## 2017-07-25 ENCOUNTER — OCCUPATIONAL MEDICINE (OUTPATIENT)
Dept: OCCUPATIONAL MEDICINE | Facility: CLINIC | Age: 37
End: 2017-07-25
Payer: COMMERCIAL

## 2017-07-25 VITALS
OXYGEN SATURATION: 97 % | SYSTOLIC BLOOD PRESSURE: 128 MMHG | TEMPERATURE: 98.5 F | HEART RATE: 72 BPM | DIASTOLIC BLOOD PRESSURE: 84 MMHG

## 2017-07-25 DIAGNOSIS — S61.512A LACERATION OF LEFT WRIST WITHOUT COMPLICATION, INITIAL ENCOUNTER: ICD-10-CM

## 2017-07-25 PROCEDURE — 99212 OFFICE O/P EST SF 10 MIN: CPT | Performed by: PREVENTIVE MEDICINE

## 2017-07-25 RX ORDER — IBUPROFEN 600 MG/1
600 TABLET ORAL EVERY 6 HOURS PRN
COMMUNITY

## 2017-07-25 ASSESSMENT — PAIN SCALES - GENERAL: PAINLEVEL: 4=SLIGHT-MODERATE PAIN

## 2017-07-25 NOTE — PROGRESS NOTES
Subjective:      Rayray Hensley is a 37 y.o. male who presents with Other      DOI 6/23/2017: Laceration to his left wrist. 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 Patient states that the pain overall is improved but continues pain just distal to the laceration. He states that he continues to have numbness and tingling in the first through third digits. He states that if he pushes over her scar that he gets shooting pain up into those 3 digits as well. He thinks the numbness and healing may be improving but is unsure. Patient states that he has appointment with the hand surgeon on August 9.     Other        ROS       Objective:     /84 mmHg  Pulse 72  Temp(Src) 36.9 °C (98.5 °F)  SpO2 97%     Physical Exam    Left wrist: 4 cm laceration volar aspect of wrist, well-healed. Mild tenderness in this area. Full range of motion of wrist. Decreased sensation to light touch in the median nerve distribution. Tinel's sign elicits radiating pain through first through third digits.       Assessment/Plan:     1. Laceration of left wrist without complication, initial encounter  Keep appointment with hand surgery  Continue full duty  Follow-up 2.5 weeks after hand surgery appointment

## 2017-07-25 NOTE — MR AVS SNAPSHOT
Rayray Hensley   2017 9:20 AM   Occupational Medicine   MRN: [id number]    Department:  [facility]   Dept Phone:  [phone]    Description:  Male : 1980   Provider:  César Dunn D.O.           Reason for Visit     Other DOi 17-L Wrist- better- ROOM 3      Allergies as of 2017     No Known Allergies      You were diagnosed with     Laceration of left wrist without complication, initial encounter   [[id number]]         Vital Signs     Blood Pressure Pulse Temperature Oxygen Saturation Smoking Status       128/84 mmHg 72 36.9 °C (98.5 °F) 97% Never Smoker        Basic Information     Date Of Birth Sex Race Ethnicity Preferred Language    1980 Male White  Origin (Portuguese,French,Faroese,Levon, etc) English      Your appointments     2017  9:20 AM   Workers Compensation with César Dunn D.O.   [address] [id number] [phone]              Health Maintenance        Date Due Completion Dates    IMM INFLUENZA (1) 2017 ---    IMM DTaP/Tdap/Td Vaccine (2 - Td) 2027            Current Immunizations     Tdap Vaccine 2017  2:32 PM      Below and/or attached are the medications your provider expects you to take. Review all of your home medications and newly ordered medications with your provider and/or pharmacist. Follow medication instructions as directed by your provider and/or pharmacist. Please keep your medication list with you and share with your provider. Update the information when medications are discontinued, doses are changed, or new medications (including over-the-counter products) are added; and carry medication information at all times in the event of emergency situations     Allergies:  No Known Allergies          Medications  Valid as of: 2017 -  9:09 AM    Generic Name Brand Name Tablet Size Instructions for use    Ibuprofen (Tab) MOTRIN 600  MG Take 600 mg by mouth every 6 hours as needed.        .                 Medicines prescribed today were sent to:     [facility]/PHARMACY #[id number] - [place], [place] - [address] [facility]    [address] [place] [address]    Phone: [phone] Fax: [phone]    Open 24 Hours?: No      Medication refill instructions:       If your prescription bottle indicates you have medication refills left, it is not necessary to call your provider’s office. Please contact your pharmacy and they will refill your medication.    If your prescription bottle indicates you do not have any refills left, you may request refills at any time through one of the following ways: The online [employer] system (except Urgent Care), by calling your provider’s office, or by asking your pharmacy to contact your provider’s office with a refill request. Medication refills are processed only during regular business hours and may not be available until the next business day. Your provider may request additional information or to have a follow-up visit with you prior to refilling your medication.   *Please Note: Medication refills are assigned a new Rx number when refilled electronically. Your pharmacy may indicate that no refills were authorized even though a new prescription for the same medication is available at the pharmacy. Please request the medicine by name with the pharmacy before contacting your provider for a refill.           [employer] Access Code: [email or web address]  Expires: 8/24/2017  9:09 AM    [employer]  A secure, online tool to manage your health information     [employer]’s [employer]® is a secure, online tool that connects you to your personalized health information from the privacy of your home -- day or night - making it very easy for you to manage your healthcare. Once the activation process is completed, you can even access your medical information using the [employer] uche, which is available for free in the [employer] store or Google Play store.      [employer] provides the following levels of access (as shown below):   My Chart Features   [facility] Primary [facility] Doctor [facility]  Specialists [facility]  Urgent  Care Non-[facility]  Primary Care  Doctor   Email your healthcare team securely and privately 24/7 X X X    Manage appointments: schedule your next appointment; view details of past/upcoming appointments X      Request prescription refills. X      View recent personal medical records, including lab and immunizations X X X X   View health record, including health history, allergies, medications X X X X   Read reports about your outpatient visits, procedures, consult and ER notes X X X X   See your discharge summary, which is a recap of your hospital and/or ER visit that includes your diagnosis, lab results, and care plan. X X       How to register for [employer]:  1. Go to  [email or web address].  2. Click on the Sign Up Now box, which takes you to the New Member Sign Up page. You will need to provide the following information:  a. Enter your [employer] Access Code exactly as it appears at the top of this page. (You will not need to use this code after you’ve completed the sign-up process. If you do not sign up before the expiration date, you must request a new code.)   b. Enter your date of birth.   c. Enter your home email address.   d. Click Submit, and follow the next screen’s instructions.  3. Create a [employer] ID. This will be your [employer] login ID and cannot be changed, so think of one that is secure and easy to remember.  4. Create a [employer] password. You can change your password at any time.  5. Enter your Password Reset Question and Answer. This can be used at a later time if you forget your password.   6. Enter your e-mail address. This allows you to receive e-mail notifications when new information is available in [employer].  7. Click Sign Up. You can now view your health information.    For assistance activating your [employer] account, call [phone]

## 2017-07-25 NOTE — Clinical Note
[address],   [address] [address]  Phone: [phone] - Fax: [phone]        [facility] Progress Report and Disability Certification  Date of Service: 7/25/2017   No Show:  No  Date / Time of Next Visit: 8/11/2017@8:00 AM   Claim Information   Patient Name: Rayray Hensley  Claim Number:     Employer:   [employer] Date of Injury: 6/23/2017     Insurer / TPA: [employer]  ID / SSN:     Occupation: Fabricador  Diagnosis: The encounter diagnosis was Laceration of left wrist without complication, initial encounter.    Medical Information   Related to Industrial Injury? Yes    Subjective Complaints:  DOI 6/23/2017: Laceration to his left wrist. 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 Patient states that the pain overall is improved but continues pain just distal to the laceration. He states that he continues to have numbness and tingling in the first through third digits. He states that if he pushes over her scar that he gets shooting pain up into those 3 digits as well. He thinks the numbness and healing may be improving but is unsure. Patient states that he has appointment with the hand surgeon on August 9.   Objective Findings: Left wrist: 4 cm laceration volar aspect of wrist, well-healed. Mild tenderness in this area. Full range of motion of wrist. Decreased sensation to light touch in the median nerve distribution. Tinel's sign elicits radiating pain through first through third digits.   Pre-Existing Condition(s):     Assessment:   Condition Same    Status: Additional Care Required  Permanent Disability:No    Plan:      Diagnostics:      Comments:  Keep appointment with hand surgery  Continue full duty  Follow-up 2.5 weeks after hand surgery appointment    Disability Information   Status: Released to Full Duty    From:  7/25/2017  Through: 8/11/2017 Restrictions  are:     Physical Restrictions   Sitting:    Standing:    Stooping:    Bending:      Squatting:    Walking:    Climbing:    Pushing:      Pulling:    Other:    Reaching Above Shoulder (L):   Reaching Above Shoulder (R):       Reaching Below Shoulder (L):    Reaching Below Shoulder (R):      Not to exceed Weight Limits   Carrying(hrs):   Weight Limit(lb):   Lifting(hrs):   Weight  Limit(lb):     Comments:      Repetitive Actions   Hands: i.e. Fine Manipulations from Grasping:     Feet: i.e. Operating Foot Controls:     Driving / Operate Machinery:     Physician Name: César Cervantes D.O. Physician Signature: elizaSignCÉSAR CERVANTES D.O. e-Signature: Dr. Trevin Pappas, Medical Director   Clinic Name / Location: [address],   [address] [place] [address] Clinic Phone Number: Dept: [phone]   Appointment Time: 9:20 Am Visit Start Time: 8:54 AM   Check-In Time:  8:49 Am Visit Discharge Time:  9:10 AM   Original-Treating Physician or Chiropractor    Page 2-Insurer/TPA    Page 3-Employer    Page 4-Employee

## 2017-08-10 ENCOUNTER — TELEPHONE (OUTPATIENT)
Dept: OCCUPATIONAL MEDICINE | Facility: CLINIC | Age: 37
End: 2017-08-10

## 2017-08-10 NOTE — TELEPHONE ENCOUNTER
Patient was still was unable to see hand surgeon before his appointment wanted me to cancel his appointment for 08-11-17 until he can get in with the hand surgeon. I informed the pt. To make sure to follow up with us in 30 days if he still has not be able to see the hand surgeon.

## 2017-08-23 ENCOUNTER — OCCUPATIONAL MEDICINE (OUTPATIENT)
Dept: OCCUPATIONAL MEDICINE | Facility: CLINIC | Age: 37
End: 2017-08-23
Payer: COMMERCIAL

## 2017-08-23 VITALS
TEMPERATURE: 99 F | BODY MASS INDEX: 31.52 KG/M2 | HEIGHT: 68 IN | SYSTOLIC BLOOD PRESSURE: 130 MMHG | WEIGHT: 208 LBS | DIASTOLIC BLOOD PRESSURE: 74 MMHG | OXYGEN SATURATION: 96 % | RESPIRATION RATE: 14 BRPM | HEART RATE: 73 BPM

## 2017-08-23 DIAGNOSIS — S61.512S: ICD-10-CM

## 2017-08-23 PROCEDURE — 99212 OFFICE O/P EST SF 10 MIN: CPT | Performed by: PREVENTIVE MEDICINE

## 2017-08-23 ASSESSMENT — PAIN SCALES - GENERAL: PAINLEVEL: 2=MINIMAL-SLIGHT

## 2017-08-23 NOTE — Clinical Note
[address],   [address] [address]  Phone: [phone] - Fax: [phone]        [facility] Progress Report and Disability Certification  Date of Service: 8/23/2017   No Show:  No  Date / Time of Next Visit: 9/26/2017 @9:20 AM   Claim Information   Patient Name: Rayray Hensley  Claim Number:     Employer:    [employer] Date of Injury: 6/23/2017     Insurer / TPA: [employer]  ID / SSN:     Occupation: Fabricador  Diagnosis: The encounter diagnosis was Laceration of left wrist without complication, sequela.    Medical Information   Related to Industrial Injury? Yes    Subjective Complaints:  DOI 6/23/2017: Laceration to his left wrist. 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 Patient had numbness in the distribution of the median nerve after injury. Patient states that he was seen by the hand surgeon to 2-3 weeks ago. He states that the surgeon offered to reopen the wound and a borderline to inspect the tendons and nerves. He states he wanted to delay that because he is felt good improvement in his sensation over the last month. He states that he has feeling again in his thenar eminence thumb and index finger, but continues to have numbness and tingling in the web spacing between the second and third digit through the radial side third digit to the tip. He states it I to give him more time to heal on its own.   Objective Findings: Left wrist: 4 cm laceration volar aspect of wrist, well-healed. No tenderness. Full range of motion of wrist. Decreased sensation to light touch in radial side of third digit and small area on the palmar hand near the second and third digits. Tinel's sign elicits radiating pain this same area.   Pre-Existing Condition(s):     Assessment:   Condition Improved    Status: Additional Care Required  Permanent Disability:No    Plan:         Diagnostics:      Comments:  Patient will like to wait another month for exploring the wound. He states he feels much improved and like to give it time to improve on its own.  Continue full duty  Follow-up one month    Disability Information   Status: Released to Full Duty    From:  8/23/2017  Through: 9/26/2017 Restrictions are:     Physical Restrictions   Sitting:    Standing:    Stooping:    Bending:      Squatting:    Walking:    Climbing:    Pushing:      Pulling:    Other:    Reaching Above Shoulder (L):   Reaching Above Shoulder (R):       Reaching Below Shoulder (L):    Reaching Below Shoulder (R):      Not to exceed Weight Limits   Carrying(hrs):   Weight Limit(lb):   Lifting(hrs):   Weight  Limit(lb):     Comments:      Repetitive Actions   Hands: i.e. Fine Manipulations from Grasping:     Feet: i.e. Operating Foot Controls:     Driving / Operate Machinery:     Physician Name: César Dunn D.O. Physician Signature: elizaSignTAYLCÉSAR LEA D.O. e-Signature: Dr. Trevin Pappas, Medical Director   Clinic Name / Location: [address],   [address] [place] [address] Clinic Phone Number: Dept: [phone]   Appointment Time: 9:40 Am Visit Start Time: 9:37 AM   Check-In Time:  9:33 Am Visit Discharge Time: 9:54 AM   Original-Treating Physician or Chiropractor    Page 2-Insurer/TPA    Page 3-Employer    Page 4-Employee

## 2017-08-23 NOTE — PROGRESS NOTES
"Subjective:      Rayray Hensley is a 37 y.o. male who presents with Follow-Up      DOI 6/23/2017: Laceration to his left wrist. 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 Patient had numbness in the distribution of the median nerve after injury. Patient states that he was seen by the hand surgeon to 2-3 weeks ago. He states that the surgeon offered to reopen the wound and a borderline to inspect the tendons and nerves. He states he wanted to delay that because he is felt good improvement in his sensation over the last month. He states that he has feeling again in his thenar eminence thumb and index finger, but continues to have numbness and tingling in the web spacing between the second and third digit through the radial side third digit to the tip. He states it I to give him more time to heal on its own.     HPI    ROS       Objective:     /74 mmHg  Pulse 73  Temp(Src) 37.2 °C (99 °F)  Resp 14  Ht 1.727 m (5' 7.99\")  Wt 94.348 kg (208 lb)  BMI 31.63 kg/m2  SpO2 96%     Physical Exam    Left wrist: 4 cm laceration volar aspect of wrist, well-healed. No tenderness. Full range of motion of wrist. Decreased sensation to light touch in radial side of third digit and small area on the palmar hand near the second and third digits. Tinel's sign elicits radiating pain this same area.       Assessment/Plan:     1. Laceration of left wrist without complication, sequela  Patient will like to wait another month for exploring the wound. He states he feels much improved and like to give it time to improve on its own.  Continue full duty  Follow-up one month        "

## 2017-08-23 NOTE — MR AVS SNAPSHOT
"        Rayray Hensley   2017 9:40 AM   Occupational Medicine   MRN: [id number]    Department:  [facility]   Dept Phone:  [phone]    Description:  Male : 1980   Provider:  César Dunn D.O.           Reason for Visit     Follow-Up WC DOI 2017 - L Wrist - better - ROOM 2      Allergies as of 2017     No Known Allergies      You were diagnosed with     Laceration of left wrist without complication, sequela   [[id number]]         Vital Signs     Blood Pressure Pulse Temperature Respirations Height Weight    130/74 mmHg 73 37.2 °C (99 °F) 14 1.727 m (5' 7.99\") 94.348 kg (208 lb)    Body Mass Index Oxygen Saturation Smoking Status             31.63 kg/m2 96% Never Smoker          Basic Information     Date Of Birth Sex Race Ethnicity Preferred Language    1980 Male White  Origin (Sami,Syrian,[place]ao [place], etc) English      Your appointments     Sep 26, 2017  9:20 AM   Workers Compensation with César Dunn D.O.   [address] [address]   [phone]              Health Maintenance        Date Due Completion Dates    IMM INFLUENZA (1) 2017 ---    IMM DTaP/Tdap/Td Vaccine (2 - Td) 2027            Current Immunizations     Tdap Vaccine 2017  2:32 PM      Below and/or attached are the medications your provider expects you to take. Review all of your home medications and newly ordered medications with your provider and/or pharmacist. Follow medication instructions as directed by your provider and/or pharmacist. Please keep your medication list with you and share with your provider. Update the information when medications are discontinued, doses are changed, or new medications (including over-the-counter products) are added; and carry medication information at all times in the event of emergency situations     Allergies:  No Known Allergies          Medications  Valid as of: " August 23, 2017 - 10:33 AM    Generic Name Brand Name Tablet Size Instructions for use    Ibuprofen (Tab) MOTRIN 600 MG Take 600 mg by mouth every 6 hours as needed.        .                 Medicines prescribed today were sent to:     [facility] #[id number] [place], [place] - [address] [facility]    [address] [place] [address]    Phone: [phone] Fax: [phone]    Open 24 Hours?: No      Medication refill instructions:       If your prescription bottle indicates you have medication refills left, it is not necessary to call your provider’s office. Please contact your pharmacy and they will refill your medication.    If your prescription bottle indicates you do not have any refills left, you may request refills at any time through one of the following ways: The online Idiro system (except Urgent Care), by calling your provider’s office, or by asking your pharmacy to contact your provider’s office with a refill request. Medication refills are processed only during regular business hours and may not be available until the next business day. Your provider may request additional information or to have a follow-up visit with you prior to refilling your medication.   *Please Note: Medication refills are assigned a new Rx number when refilled electronically. Your pharmacy may indicate that no refills were authorized even though a new prescription for the same medication is available at the pharmacy. Please request the medicine by name with the pharmacy before contacting your provider for a refill.           Idiro Access Code: [email or web address]  Expires: 8/24/2017  9:09 AM    Idiro  A secure, online tool to manage your health information     [employer]’s Idiro® is a secure, online tool that connects you to your personalized health information from the privacy of your home -- day or night - making it very easy for you to manage your healthcare. Once the activation process is completed, you can even access your medical  information using the Neurovance uche, which is available for free in the Apple Uche store or Google Play store.     Neurovance provides the following levels of access (as shown below):   My Chart Features   [facility] Doctor [facility]  Specialists [facility]  Urgent  Care Non-[facility]  Primary Care  Doctor   Email your healthcare team securely and privately 24/7 X X X    Manage appointments: schedule your next appointment; view details of past/upcoming appointments X      Request prescription refills. X      View recent personal medical records, including lab and immunizations X X X X   View health record, including health history, allergies, medications X X X X   Read reports about your outpatient visits, procedures, consult and ER notes X X X X   See your discharge summary, which is a recap of your hospital and/or ER visit that includes your diagnosis, lab results, and care plan. X X       How to register for Neurovance:  1. Go to  [email or web address].  2. Click on the Sign Up Now box, which takes you to the New Member Sign Up page. You will need to provide the following information:  a. Enter your Neurovance Access Code exactly as it appears at the top of this page. (You will not need to use this code after you’ve completed the sign-up process. If you do not sign up before the expiration date, you must request a new code.)   b. Enter your date of birth.   c. Enter your home email address.   d. Click Submit, and follow the next screen’s instructions.  3. Create a Neurovance ID. This will be your Neurovance login ID and cannot be changed, so think of one that is secure and easy to remember.  4. Create a Neurovance password. You can change your password at any time.  5. Enter your Password Reset Question and Answer. This can be used at a later time if you forget your password.   6. Enter your e-mail address. This allows you to receive e-mail notifications when new information is available in Neurovance.  7. Click Sign Up. You can now  view your health information.    For assistance activating your [employer] account, call [phone]

## 2017-09-26 ENCOUNTER — OCCUPATIONAL MEDICINE (OUTPATIENT)
Dept: OCCUPATIONAL MEDICINE | Facility: CLINIC | Age: 37
End: 2017-09-26
Payer: COMMERCIAL

## 2017-09-26 VITALS
SYSTOLIC BLOOD PRESSURE: 146 MMHG | BODY MASS INDEX: 32.65 KG/M2 | HEART RATE: 63 BPM | OXYGEN SATURATION: 96 % | RESPIRATION RATE: 14 BRPM | WEIGHT: 208 LBS | DIASTOLIC BLOOD PRESSURE: 100 MMHG | TEMPERATURE: 97.6 F | HEIGHT: 67 IN

## 2017-09-26 DIAGNOSIS — S61.512S: ICD-10-CM

## 2017-09-26 PROCEDURE — 99212 OFFICE O/P EST SF 10 MIN: CPT | Performed by: PREVENTIVE MEDICINE

## 2017-09-26 NOTE — PROGRESS NOTES
"Subjective:      Rayray Hensley is a 37 y.o. male who presents with Follow-Up ( DOI 06/23/2017 - Wrist - Better - Room 2)      DOI 6/23/2017: Laceration to his left wrist. 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 Patient had numbness in the distribution of the median nerve after injury. Patient states that overall the symptoms have improved. However he continues to have some numbness and tingling between the webspace of the second third digit. He states he saw his surgeon yesterday who released him to full duty but also referred him to do an EMG to evaluate the nerves.     HPI    ROS       Objective:     /100   Pulse 63   Temp 36.4 °C (97.6 °F)   Resp 14   Ht 1.702 m (5' 7\")   Wt 94.3 kg (208 lb)   SpO2 96%   BMI 32.58 kg/m²      Physical Exam    Left wrist: 4 cm scar from laceration. Full range of motion of wrist. Decreased sensation to light touch in radial side of third digit and small area on the palmar hand near the second and third digits. Tinel's sign elicits radiating pain this same area.       Assessment/Plan:     1. Laceration of left wrist without complication, sequela  Continue care with hand surgeon  Released for full duty  Follow-up as needed      "

## 2017-09-26 NOTE — LETTER
[address],   [address] [place] [address]  Phone:  [phone] - Fax:  [phone]   [facility] Progress Report and Disability Certification  Date of Service: 9/26/2017   No Show:  No  Date / Time of Next Visit:  Transfer care to hand surgery   Claim Information   Patient Name: Rayray Hensley  Claim Number:     Employer:   [employer]  Date of Injury: 6/23/2017     Insurer / TPA: [employer] [place]  ID / SSN:     Occupation: Fabricador  Diagnosis: The encounter diagnosis was Laceration of left wrist without complication, sequela.    Medical Information   Related to Industrial Injury? Yes    Subjective Complaints:  DOI 6/23/2017: Laceration to his left wrist. 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 Patient had numbness in the distribution of the median nerve after injury. Patient states that overall the symptoms have improved. However he continues to have some numbness and tingling between the webspace of the second third digit. He states he saw his surgeon yesterday who released him to full duty but also referred him to do an EMG to evaluate the nerves.   Objective Findings: Left wrist: 4 cm scar from laceration. Full range of motion of wrist. Decreased sensation to light touch in radial side of third digit and small area on the palmar hand near the second and third digits. Tinel's sign elicits radiating pain this same area.   Pre-Existing Condition(s):     Assessment:   Condition Improved    Status: Discharged / Care Transfer  Permanent Disability:No    Plan:      Diagnostics:      Comments:  Continue care with hand surgeon  Released for full duty  Follow-up as needed    Disability Information   Status: Released to Full Duty    From:  9/26/2017  Through:   Restrictions are:     Physical Restrictions   Sitting:    Standing:    Stooping:    Bending:      Squatting:     Walking:    Climbing:    Pushing:      Pulling:    Other:    Reaching Above Shoulder (L):   Reaching Above Shoulder (R):       Reaching Below Shoulder (L):    Reaching Below Shoulder (R):      Not to exceed Weight Limits   Carrying(hrs):   Weight Limit(lb):   Lifting(hrs):   Weight  Limit(lb):     Comments:      Repetitive Actions   Hands: i.e. Fine Manipulations from Grasping:     Feet: i.e. Operating Foot Controls:     Driving / Operate Machinery:     Physician Name: César Dunn D.O. Physician Signature: CÉSAR Mckay D.O. e-Signature: Dr. Trevin Pappas, Medical Director   Clinic Name / Location: [address],   [address]  [place] [address] Clinic Phone Number: Dept: [phone]   Appointment Time: 9:20 Am Visit Start Time: 9:19 AM   Check-In Time:  9:14 Am Visit Discharge Time: @9:39AM    Original-Treating Physician or Chiropractor    Page 2-Insurer/TPA    Page 3-Employer    Page 4-Employee